# Patient Record
Sex: FEMALE
[De-identification: names, ages, dates, MRNs, and addresses within clinical notes are randomized per-mention and may not be internally consistent; named-entity substitution may affect disease eponyms.]

---

## 2020-10-09 PROBLEM — Z00.00 ENCOUNTER FOR PREVENTIVE HEALTH EXAMINATION: Status: ACTIVE | Noted: 2020-10-09

## 2020-10-15 ENCOUNTER — APPOINTMENT (OUTPATIENT)
Dept: NEUROLOGY | Facility: CLINIC | Age: 65
End: 2020-10-15
Payer: COMMERCIAL

## 2020-10-15 VITALS
OXYGEN SATURATION: 97 % | HEART RATE: 64 BPM | HEIGHT: 65 IN | SYSTOLIC BLOOD PRESSURE: 119 MMHG | DIASTOLIC BLOOD PRESSURE: 78 MMHG | WEIGHT: 190 LBS | BODY MASS INDEX: 31.65 KG/M2 | TEMPERATURE: 97.7 F

## 2020-10-15 DIAGNOSIS — R41.3 OTHER AMNESIA: ICD-10-CM

## 2020-10-15 PROCEDURE — 95816 EEG AWAKE AND DROWSY: CPT

## 2020-10-15 PROCEDURE — 99204 OFFICE O/P NEW MOD 45 MIN: CPT

## 2020-10-20 NOTE — HISTORY OF PRESENT ILLNESS
[FreeTextEntry1] : Patient is a 64 year old woman with no past medical history who presents for a initial evaluation for an episode of memory loss.  Patient is accompanied to this visit by her sister. \par \par Patient states two weeks ago ago she woke up around 9-9;30 am.  It was an ordinary day and she sleep well the night before.  She did not have a stressful or traumatic event.  Her brother in law called her around 12 pm and when she answered patient was not herself.  She was confused, slurred speech and she was rushing him off the phone and not answering him appropriately.  Patient does not recall two hours of the morning.  \par \par About two hours Later that day she realized she showered, dressed and does not recall any of this.  She did not remember what day it was and was concerned she was late for work.  Patient returned to her baseline about two hours later.  No memory issues currently.    \par \par Ms. Valdes's  passed away a year ago  She lives alone.  At the request of the patient I have contacted Dr. Romero ([phone]),  Patient has been working with Dr. Romero for years.  Dr. Romero states "she is great at her job" and she has never had memory loss or abnormal behavior.  He states she had  anxiety when her  passed, but this is unlike her.  He is concerned.    \par \par Patient denies tongue bite,  loss of bowel or bladder control, numbness, weakness, muscle pain. Patient denies head trauma, history of seizure or family history of seizure.

## 2020-10-20 NOTE — ASSESSMENT
[FreeTextEntry1] : Patient symptoms most consistent with transient amnesia.  We will have patient do a REEG and MRI head w/o contrast to r/o a structural cause. \par \par Plan:\par 1  REEG\par 2. MRI Head without contrast\par 3. Follow up in 2 months

## 2020-10-20 NOTE — PHYSICAL EXAM
[FreeTextEntry1] : Physical examination:  \par General:   The patient is pleasant, cooperative, well dressed and in no acute distress.  Appearance is consistent with chronologic age.  No abnormal facies.\par Neurologic examination:  The patient is oriented to person, place, time and date.   Remote and recent memory is normal.   Fund of knowledge is intact and normal.  Language with normal repetition, comprehension and naming.  Nondysarthric.   \par Cranial nerves examination: intact VA, VFF.  EOMI w/o nystagmus, skew or reported double vision.  PERRL.  No ptosis/weakness of eyelid closure.  Facial sensation is normal with normal bite.  No facial asymmetry.  Hearing grossly intact b/l.  Palate elevates midline.  Neck flexion/extension, SCM/Trap strength normal.  Tongue midline with full resistance.  \par Motor examination:   Normal tone, bulk and range of motion.  No tenderness, twitching, tremors or involuntary movements.      \par Formal Muscle Strength Testing: (MRC grade R/L) 5/5 ARON, BB, TR, WF, WE, FF, FE; 5/5 ILP, QDS, HS, DF, PF.  Arises from sitting/squatting wnl.  Toe/heel walks.  \par Reflexes:   2+ b/l pectoralis, biceps, triceps, brachioradialis, patella and Achilles.  Plantar response downgoing b/l.  Jaw jerk, Sofiya, clonus absent.  \par Sensory examination:   Intact to light touch and pinprick, pain, temperature and proprioception and vibration in all extremities.    \par Cerebellum:   FTN/HKS intact with normal CED in all limbs.   Gait is narrow based and normal with normal tandem.  Romberg (-).  No dysmetria or dysdiadokinesia.       \par \par

## 2021-01-07 ENCOUNTER — APPOINTMENT (OUTPATIENT)
Dept: NEUROLOGY | Facility: CLINIC | Age: 66
End: 2021-01-07